# Patient Record
Sex: MALE | Race: OTHER | URBAN - METROPOLITAN AREA
[De-identification: names, ages, dates, MRNs, and addresses within clinical notes are randomized per-mention and may not be internally consistent; named-entity substitution may affect disease eponyms.]

---

## 2019-05-13 NOTE — PATIENT DISCUSSION
Continue current management, but increase Azopt to TID OS since IOP is TOO HIGH today and this is eye with most glaucoma.  See me in the fall watch closely if IOP still this high will need SLT or other additional Tx OS.

## 2019-09-16 NOTE — PATIENT DISCUSSION
9/16/19.  IOP OS still too high.  Need lower.  I recommend we try to dc Azopt and start Rhopressa QHS OS with Lg.  want IOP OS down around.

## 2019-10-28 NOTE — PATIENT DISCUSSION
10/28/19 failed on Azopt and with Rhopressa on board IOP is MUCH better and pt has severe glc OS and needs this medication to help thwart blindness (pt is also allergic to Brimonidine and cannot use B-blockers due to sleep apnea so our medication choices are quite limited).

## 2020-01-20 NOTE — PATIENT DISCUSSION
1/20/20.  IOP spike post DFE OS>OD.  IOP OS much too high today even at 21 before DFE.  Need lower IOP.  CPM but also add Azopt TID OS (one drop in office today at 1:52 pm) and see [facility] for SLT OS +/- OD or consider Xen OS.  need IOP all visits <13 OS.

## 2020-01-27 NOTE — PATIENT DISCUSSION
1/27/20 IOP much too high for OS disc.  DC Azopt and Simbrinza TID OS today (questionable Hx of brimonidine allergy in past but with this IOP is worth re-trying in the short term need to get IOP lower on way to surgery).  Continue Rhopressa and Lg as Rx.  needs to see [address] likely need Xen ASAP.

## 2020-01-27 NOTE — PATIENT DISCUSSION
1/20/20.  IOP spike post DFE OS>OD.  IOP OS much too high today even at 21 before DFE.  Need lower IOP.  CPM but also add Azopt TID OS (one drop in office today at 1:52 pm) and see [facility] for SLT OS +/- OD or consider Xen OS.  need IOP all visits <13 OS.

## 2020-01-28 NOTE — PATIENT DISCUSSION
Pt on mtmt left eye and developing allergies. Recommend glc surgery. R&B of Xen gel implant reviewed. Pt understands that he may need additional procedure in the future. Surgery will not bring back vision already lost to glaucoma.

## 2020-02-18 NOTE — PATIENT DISCUSSION
2/18/20: start Durezol BID OD x 5 days, then QD x 5 days and also add tobramycin QID for one week.  Ed this may help OD look temp better but now he has a relative comparison to OS which now looks WHITE bc it is off of Lg and Rhopressa and is on Durezol which calms inflammation and make the eye look white while OD continues to be much more RED due to meds and surface inflammation.

## 2020-02-26 NOTE — PATIENT DISCUSSION
Will need sx most likely once OS is stable. Discussed option of stopping Rhopressa but pt will continue as his complaint is redness not discomfort, blurry vision etc/  Pt will stop Durezol in OD.

## 2020-03-09 NOTE — PATIENT DISCUSSION
3/9/2020:  OD feels irritated bc still on Rhopressa and Lumigan and this is why it feels so much worse vs OS - IOP OD also rising I think we need to move to Xen OD or alter drops OD to try to lower IOP with less or different meds.  For now I recommend we stop Rhopressa and replace with Azopt TID OD.  for now continue Lg and see how IOP responds.  may need SLT OD with [address] to help with IOP.

## 2020-03-11 NOTE — PATIENT DISCUSSION
CONTINUE DUREZOL QID, HOLD CYCLOGYL IN OS. NO EYE RUBBING. ANSWERED ALL QUESTIONS PT BROUGHT IN TODAY FOR [address]. PT WILL MOST LIKELY NEED GLAUCOMA SX IN OD ONCE OS IS STABLE. PT TOLERATING DROPS IN OD SO FAR.

## 2020-03-30 NOTE — PATIENT DISCUSSION
DECREASE DUREZOL TID FOR 3 WEEKS, THEN BID UNTIL RETURNS. PT WILL MOST LIKELY NEED GLAUCOMA SX IN OD ONCE OS IS STABLE. PT TOLERATING DROPS IN OD SO FAR.

## 2020-05-18 NOTE — PATIENT DISCUSSION
REC SCHEDULING XEN FOR OD. PT OK TO WAIT A COUPLE MONTHS BEFORE THEN. PT WANTS TO HAVE IT DONE SOONER THAN LATER. PLAN: XEN 45 WITH [phone] OD. DISCUSSED AND R&B.

## 2020-05-18 NOTE — PATIENT DISCUSSION
DECREASE DUREZOL QDAY FOR A MONTH THEN DC. PT WILL MOST LIKELY NEED GLAUCOMA SX IN OD ONCE OS IS STABLE. PT TOLERATING DROPS IN OD SO FAR.

## 2020-06-17 NOTE — PATIENT DISCUSSION
Good postoperative appearance. Continue Durezol qid OD.  No eye rubbing, shield qhs. Continue Durezol OS qday. Hold vigamox in OD.

## 2020-07-22 NOTE — PATIENT DISCUSSION
DECREASE DUREZOL TO BID IN OD FOR 4 WEEKS, THEN QDAY UNTIL NEXT VISIT. BUT CONTINUE QDAY IN OS. NO EYE RUBBING.

## 2020-09-30 NOTE — PATIENT DISCUSSION
Pt was supposed to taper Durezol from bid to qd OD and continue Durezol qd OS; however, pt continued Durezol bid OD and DC's Durezol OS in August due to a misunderstanding.

## 2020-10-28 NOTE — PATIENT DISCUSSION
PT HAS BEEN USING DUREZOL AS INSTRUCTED.  IOP IN TARGET RANGE.  CONTINUE DUREZOL QD UNTIL GONE THEN STOP.

## 2022-04-26 ENCOUNTER — PREPPED CHART (OUTPATIENT)
Dept: URBAN - METROPOLITAN AREA CLINIC 7 | Facility: CLINIC | Age: 47
End: 2022-04-26

## 2024-05-10 NOTE — PATIENT DISCUSSION
Discussed importance of compliance with ocular meds and follow up exams to prevent loss of vision. Admission